# Patient Record
(demographics unavailable — no encounter records)

---

## 2024-10-30 NOTE — DISCUSSION/SUMMARY
[FreeTextEntry1] : 3 YO here for URI Supportive care discussed with MOC such as increasing fluids, monitor temp and administer Tylenol/Motrin PRN, advised to monitor UOP, if no UOP within 8 hours go to ED, encourage clear liquids, encourage pt. to cough in order to clear chest congestion, steam bathroom inhalation along with chest PT, NS nasal spray, cool mist humidifier use, monitor for any changes of symptoms, verbal understanding received Reviewed ED precautions s/s of SOB, retractions, and labored breathing, verbal understanding received RTC for WCC/PRN

## 2024-10-30 NOTE — PHYSICAL EXAM
[Clear Rhinorrhea] : clear rhinorrhea [Clear to Auscultation Bilaterally] : clear to auscultation bilaterally [NL] : warm, clear [Erythematous Oropharynx] : nonerythematous oropharynx

## 2024-10-30 NOTE — HISTORY OF PRESENT ILLNESS
[FreeTextEntry6] : cough, congestion off and on xfew weeks emesis x2 episodes the last 3 weeks, after coughing afebrile first year in school  active and playful eating and drinking at baseline           denies diarrhea

## 2025-03-17 NOTE — DEVELOPMENTAL MILESTONES
[Normal Development] : Normal Development [None] : none [Goes to the bathroom and has] : goes to bathroom and has bowel movement by self [Dresses and undresses without] : dresses and undresses without much help [Plays make-believe] : plays make-believe [Uses 4-word sentences] : uses 4-word sentences [Uses words that are 100%] : uses words that are 100% intelligible to strangers [Climbs stairs, alternating feet] : climbs stairs, alternating feet without support [Skips on one foot] : skips on one foot [Draws a person with head and] : draws a person with head and 3 body part [Draws a simple cross] : draws a simple cross [Unbuttons medium-sized buttons] : unbuttons medium sized buttons

## 2025-03-17 NOTE — DISCUSSION/SUMMARY
[Normal Growth] : growth [Normal Development] : development  [No Elimination Concerns] : elimination [Continue Regimen] : feeding [No Skin Concerns] : skin [Normal Sleep Pattern] : sleep [None] : no medical problems [School Readiness] : school readiness [Healthy Personal Habits] : healthy personal habits [TV/Media] : tv/media [Child and Family Involvement] : child and family involvement [Safety] : safety [] : The components of the vaccine(s) to be administered today are listed in the plan of care. The disease(s) for which the vaccine(s) are intended to prevent and the risks have been discussed with the caretaker.  The risks are also included in the appropriate vaccination information statements which have been provided to the patient's caregiver.  The caregiver has given consent to vaccinate. [FreeTextEntry1] : Ellen is a 5y/o F with no past medical history presenting for her [facility]. She is doing well with no parental concerns. She continues to grow and track along her growth curve. Today weight >99%tile, reinforced healthy eating habits including limiting sugary drinks/ fast foods and incorporating more fruits and vegetables into diet. Developmentally appropriate. No concerns on exam. Patient is due for MMR and DTaP-IPV today, otherwise immunizations are up to date. Parents provided script for routine lab work (CBC and lead) but will also send for HgA1C, lipids and LFTS due to overweight.  Continue balanced diet with all food groups. Brush teeth twice a day with toothbrush. Recommend visit to dentist. As per car seat 's guidelines, use forward-facing booster seat until child reaches highest weight/height for seat. Child needs to ride in a belt-positioning booster seat until  4 feet 9 inches has been reached and are between 8 and 12 years of age.  Put child to sleep in own bed. Help child to maintain consistent daily routines and sleep schedule. Pre-K discussed. Ensure home is safe. Teach child about personal safety. Use consistent, positive discipline. Read aloud to child. Limit screen time to no more than 2 hours per day.  SDOH domains were screened and scored. Return in 1 year for routine well child check or earlier if needed.

## 2025-03-17 NOTE — PHYSICAL EXAM
[Alert] : alert [No Acute Distress] : no acute distress [Playful] : playful [Normocephalic] : normocephalic [Conjunctivae with no discharge] : conjunctivae with no discharge [PERRL] : PERRL [EOMI Bilateral] : EOMI bilateral [Clear Tympanic membranes with present light reflex and bony landmarks] : clear tympanic membranes with present light reflex and bony landmarks [Nares Patent] : nares patent [Pink Nasal Mucosa] : pink nasal mucosa [Uvula Midline] : uvula midline [Nonerythematous Oropharynx] : nonerythematous oropharynx [No Caries] : no caries [Supple, full passive range of motion] : supple, full passive range of motion [No Palpable Masses] : no palpable masses [Clear to Auscultation Bilaterally] : clear to auscultation bilaterally [Regular Rate and Rhythm] : regular rate and rhythm [Normal S1, S2 present] : normal S1, S2 present [Soft] : soft [NonTender] : non tender [Non Distended] : non distended [Normoactive Bowel Sounds] : normoactive bowel sounds [Cullen 1] : Cullen 1 [No Clitoromegaly] : no clitoromegaly [Normally Placed] : normally placed [No Abnormal Lymph Nodes Palpated] : no abnormal lymph nodes palpated [Symmetric Hip Rotation] : symmetric hip rotation [No Gait Asymmetry] : no gait asymmetry [No pain or deformities with palpation of bone, muscles, joints] : no pain or deformities with palpation of bone, muscles, joints [Normal Muscle Tone] : normal muscle tone [Straight] : straight [Cranial Nerves Grossly Intact] : cranial nerves grossly intact [No Rash or Lesions] : no rash or lesions [FreeTextEntry1] : Overweight [FreeTextEntry4] : Rhinorrhea

## 2025-03-17 NOTE — HISTORY OF PRESENT ILLNESS
[whole ___ oz/d] : consumes [unfilled] oz of whole cow's milk per day [Fruit] : fruit [Vegetables] : vegetables [Meat] : meat [___ stools per day] : [unfilled]  stools per day [___ voids per day] : [unfilled] voids per day [Toilet Trained] : toilet trained [Normal] : Normal [In own bed] : In own bed [Brushing teeth] : Brushing teeth [Yes] : Patient goes to dentist yearly [Toothpaste] : Primary Fluoride Source: Toothpaste [In Pre-K] : In Pre-K [Playtime (60 min/d)] : Playtime 60 min a day [Appropiate parent-child communication] : Appropriate parent-child communication [Child given choices] : Child given choices [Child Cooperates] : Child cooperates [Parent has appropriate responses to behavior] : Parent has appropriate responses to behavior [No] : No cigarette smoke exposure [Water heater temperature set at <120 degrees F] : Water heater temperature set at <120 degrees F [Car seat in back seat] : Car seat in back seat [Carbon Monoxide Detectors] : Carbon monoxide detectors [Smoke Detectors] : Smoke detectors [Supervised outdoor play] : Supervised outdoor play [Up to date] : Up to date [NO] : No [Sugar drinks] : sugar drinks [Grains] : grains [Eggs] : eggs [Exposure to electronic nicotine delivery system] : No exposure to electronic nicotine delivery system [FreeTextEntry7] : Patient is a 3y/o F with no past medical history presenting for WCC. No recent hospitalizations or ED visits. Patient with some nasal congestion but no cough, fever or increase work of breathing,  [de-identified] : No parental concerns. [de-identified] : No fast food. Drinks juice.